# Patient Record
Sex: FEMALE | Race: WHITE | NOT HISPANIC OR LATINO | Employment: FULL TIME | URBAN - METROPOLITAN AREA
[De-identification: names, ages, dates, MRNs, and addresses within clinical notes are randomized per-mention and may not be internally consistent; named-entity substitution may affect disease eponyms.]

---

## 2025-03-06 ENCOUNTER — APPOINTMENT (OUTPATIENT)
Dept: ALLERGY | Facility: CLINIC | Age: 55
End: 2025-03-06
Payer: COMMERCIAL

## 2025-03-06 VITALS — WEIGHT: 135 LBS

## 2025-03-06 DIAGNOSIS — J31.0 OTHER RHINITIS: Primary | ICD-10-CM

## 2025-03-06 PROCEDURE — 95004 PERQ TESTS W/ALRGNC XTRCS: CPT | Performed by: ALLERGY & IMMUNOLOGY

## 2025-03-06 PROCEDURE — 95024 IQ TESTS W/ALLERGENIC XTRCS: CPT | Performed by: ALLERGY & IMMUNOLOGY

## 2025-03-06 PROCEDURE — 99204 OFFICE O/P NEW MOD 45 MIN: CPT | Performed by: ALLERGY & IMMUNOLOGY

## 2025-03-06 RX ORDER — FLUTICASONE PROPIONATE 50 MCG
2 SPRAY, SUSPENSION (ML) NASAL DAILY
COMMUNITY
Start: 2025-02-12 | End: 2026-02-07

## 2025-03-06 RX ORDER — LAMOTRIGINE 200 MG/1
200 TABLET ORAL 2 TIMES DAILY
COMMUNITY
Start: 2024-04-02 | End: 2025-12-18

## 2025-03-06 RX ORDER — LEVOTHYROXINE SODIUM 100 UG/1
100 TABLET ORAL DAILY
COMMUNITY
Start: 2024-04-02

## 2025-03-06 NOTE — ASSESSMENT & PLAN NOTE
She C/O 6 months of increasing congestion and chronic bilateral ear popping, she believes are seasonal.  CT sinuses Jan 25 showed mucosal thickening and nasal polyps.  She has had several rounds of antibiotics and using Flonase x3 weeks, but prefers to not take daily medications.  She is S/P polypectomy around 2009.    Skin testing is negative.    She will continue Flonase as needed. She will follow up as needed

## 2025-03-06 NOTE — PROGRESS NOTES
"  Subjective   Patient ID:   [id number]   Latasha Alvarez is a 55 y.o. female who presents for Allergy Testing and Sinusitis (NPV).    Chief Complaint   Patient presents with    Allergy Testing    Sinusitis     NPV      This is a new patient, with H/O epilepsy, hypothyroidism, presenting for allergy testing and recurrent sinusitis.    Patient reports the last 6 months she has had increased sinus congestion and chronic bilateral ear popping, left worse than right.  She believes her symptoms are seasonal.  She was in [place] around October 2023 and wonders if this could have triggered her Sx.      Patient had a CT sinuses 1-2-25 showing mucosal thickening and \"polyps\" per patient.  She saw Dr. Cleve Elias, ENT, 2-12-25.  She has had several rounds of antibiotics including amoxicillin and doxycycline.  She has been using Flonase daily for the last 3 weeks, but not sure it works for her.  She also uses a sinus rinse morning and evening, which help and produces clear mucous.  She prefers not to have to take daily medications.  She has a hypoallergenic Pomeranian-poodle mix x9 years she does not react to.  She works at home.  She denies exposure to rodents or farm animals.    Patient had polypectomy, she thinks, around 2009, but cannot find records anywhere.  She has reflux less than 3x a week and is dependent on what she eats.    Review of Systems   HENT:  Positive for congestion.         Intermittent bilateral ear popping, left worse than right.   Gastrointestinal:         Intermittent reflux.     Objective   Wt 61.2 kg (135 lb)      Physical Exam  Constitutional:       Appearance: Normal appearance.   HENT:      Head: Normocephalic and atraumatic.      Right Ear: External ear normal. There is no impacted cerumen.      Left Ear: External ear normal. There is no impacted cerumen.      Nose: No congestion or rhinorrhea.   Eyes:      Extraocular Movements: Extraocular movements intact.      Conjunctiva/sclera: Conjunctivae " normal.      Pupils: Pupils are equal, round, and reactive to light.   Cardiovascular:      Rate and Rhythm: Normal rate and regular rhythm.      Heart sounds: No murmur heard.     No friction rub. No gallop.   Pulmonary:      Effort: No respiratory distress.      Breath sounds: No wheezing, rhonchi or rales.   Skin:     General: Skin is warm and dry.   Neurological:      Mental Status: She is alert.   Psychiatric:         Mood and Affect: Mood normal.         Behavior: Behavior normal.     Allergy testing was performed on [facility] using standard technique. There were no immediate complications.    Test Results  Panel 1  1.   Histamine: 5 x 5  2.   Saline - Diluent: 0  3.   Cockroach: 0  4.   Cotton Linters: 0  5.   Cat: 0  6.   Do  7.   D. Farinae: 0  8.   D. Pter: 0  9.   Feather: 0  10. Alternaria: 0  Tree Panel  1.   Kevin: 0  2.   Beech: 0  3.   Birch: 0  4.   [place]: 0  5.   Silver Maple: 0  6.   Hickory: 0  7.   Maple: 0  8.   Oak: 0  9.   [place]: 0  10. [place]: 0  Grass/Misc Tree  1.   [place]: 0  2.   [place] Blue: 0  3.   Rolly: 0  4.   Clay: 0  5.   Orchard: 0  6.   Red Top: 0  7.   Rye Grass: 0  8.   Sweet Vernal: 0  9.   Black [place]: 0  10. [place]: 0  Weeds  1.   Cocklebur: 0  2.   Common Mugwort: 0  3.   English Plantain: 0  4.   Hemp: 0  5.   Goldenrod: 0  6.   Kochia: 0  7.   Lamb's Quarter: 0  8.   [place] Elder: 0  9.   Pigweed: 0  10. Ragweed: 0  Molds  1.   Aspergillus [place]: 0  2.   Aureobasid: 0  3.   Bipolaris: 0  4.   Cladosporidium: 0  5.   Epicoccum: 0  6.   Fusarium: 0  7.   Geotrichum: 0  8.   Helminthosporium: 0  9.   Penicillium: 0  10. Phoma: 0    Intradermal allergy testing was performed on [facility] using standard technique. There were no immediate complications.    Test Results  Controls  Intradermal Saline: 0  ID  Cat: 0  Cockroach: 0  Common Weed Mix: 0  Cotton: 0  Do  Feather: 0  KORT Mix: 0  Mite Mix: 0  Mold Mix #1: 0  Mold Mix #2: 0  Ragweed:  0  Tree Mix: 0    Skin testing is negative.    Assessment/Plan     Nasal inflammation  She C/O 6 months of increasing congestion and chronic bilateral ear popping, she believes are seasonal.  CT sinuses Jan 25 showed mucosal thickening and nasal polyps.  She has had several rounds of antibiotics and using Flonase x3 weeks, but prefers to not take daily medications.  She is S/P polypectomy around 2009.    Skin testing is negative.    She will continue Flonase as needed. She will follow up as needed    By signing my name below, I, Lennox Donnellyibe, attest that this documentation has been prepared under the direction and in the presence of Omaira Espinoza MD.  All medical record entries made by the Scribe were at my direction and personally dictated by me. I have reviewed the chart and agree that the record accurately reflects my personal performance of the history, physical exam, discussion and plan.

## 2025-03-06 NOTE — PATIENT INSTRUCTIONS
Skin testing is negative.    Continue Flonase as needed.  To increase the efficacy of your nasal spray, be sure to look down while using it.? Spray slightly away from the direction of your nasal septum (the bone in the middle of your nose) and only sniff after you have sprayed - avoid spraying and sniffing at the same time, or else a lot of spray will go down your throat.     Follow up as needed.

## 2025-06-03 LAB
NON-UH HIE A/G RATIO: 1.1
NON-UH HIE ALB: 4.2 G/DL (ref 3.4–5)
NON-UH HIE ALK PHOS: 90 UNIT/L (ref 45–117)
NON-UH HIE BILIRUBIN, TOTAL: 0.4 MG/DL (ref 0.3–1.2)
NON-UH HIE BUN/CREAT RATIO: 16.7
NON-UH HIE BUN: 15 MG/DL (ref 9–23)
NON-UH HIE CALCIUM: 9.8 MG/DL (ref 8.7–10.4)
NON-UH HIE CALCULATED OSMOLALITY: 283 MOSM/KG (ref 275–295)
NON-UH HIE CHLORIDE: 103 MMOL/L (ref 98–107)
NON-UH HIE CO2, VENOUS: 29 MMOL/L (ref 20–31)
NON-UH HIE CREATININE: 0.9 MG/DL (ref 0.5–0.8)
NON-UH HIE FREE T3: 3.2 PG/ML (ref 2.3–4.2)
NON-UH HIE FREE T4: 1.75 NG/DL (ref 0.89–1.76)
NON-UH HIE GFR AA: >60
NON-UH HIE GLOBULIN: 4 G/DL
NON-UH HIE GLOMERULAR FILTRATION RATE: >60 ML/MIN/1.73M?
NON-UH HIE GLUCOSE: 78 MG/DL (ref 74–106)
NON-UH HIE GOT: 28 UNIT/L (ref 15–37)
NON-UH HIE GPT: 31 UNIT/L (ref 10–49)
NON-UH HIE K: 4.2 MMOL/L (ref 3.5–5.1)
NON-UH HIE NA: 142 MMOL/L (ref 135–145)
NON-UH HIE TOTAL PROTEIN: 8.2 G/DL (ref 5.7–8.2)
NON-UH HIE TSH: 0.62 UIU/ML (ref 0.55–4.78)
NON-UH HIE VIT D 25: 47 NG/ML